# Patient Record
Sex: FEMALE | Race: WHITE | NOT HISPANIC OR LATINO | URBAN - METROPOLITAN AREA
[De-identification: names, ages, dates, MRNs, and addresses within clinical notes are randomized per-mention and may not be internally consistent; named-entity substitution may affect disease eponyms.]

---

## 2019-10-22 ENCOUNTER — DOCTOR'S OFFICE (OUTPATIENT)
Dept: URBAN - METROPOLITAN AREA CLINIC 155 | Facility: CLINIC | Age: 69
Setting detail: OPHTHALMOLOGY
End: 2019-10-22
Payer: COMMERCIAL

## 2019-10-22 DIAGNOSIS — H35.363: ICD-10-CM

## 2019-10-22 DIAGNOSIS — H11.31: ICD-10-CM

## 2019-10-22 PROCEDURE — 92004 COMPRE OPH EXAM NEW PT 1/>: CPT | Performed by: OPHTHALMOLOGY

## 2019-10-22 PROCEDURE — 92134 CPTRZ OPH DX IMG PST SGM RTA: CPT | Performed by: OPHTHALMOLOGY

## 2019-10-22 ASSESSMENT — REFRACTION_MANIFEST
OS_VA2: 20/
OD_VA3: 20/
OU_VA: 20/
OS_VA1: 20/
OS_VA2: 20/
OD_VA2: 20/
OD_VA1: 20/
OU_VA: 20/
OD_VA3: 20/
OD_VA1: 20/
OD_VA2: 20/
OS_VA1: 20/
OS_VA3: 20/
OS_VA3: 20/

## 2019-10-22 ASSESSMENT — CONFRONTATIONAL VISUAL FIELD TEST (CVF)
OD_FINDINGS: FULL
OS_FINDINGS: FULL

## 2019-10-22 ASSESSMENT — VISUAL ACUITY
OD_BCVA: 20/40-1
OS_BCVA: 20/40

## 2019-10-22 ASSESSMENT — REFRACTION_CURRENTRX
OS_OVR_VA: 20/
OD_OVR_VA: 20/
OS_OVR_VA: 20/
OS_OVR_VA: 20/
OD_OVR_VA: 20/
OD_OVR_VA: 20/

## 2020-07-07 ENCOUNTER — OFFICE VISIT (OUTPATIENT)
Dept: CARDIOLOGY | Facility: CLINIC | Age: 70
End: 2020-07-07

## 2020-07-07 VITALS
HEIGHT: 66 IN | TEMPERATURE: 98.1 F | DIASTOLIC BLOOD PRESSURE: 82 MMHG | SYSTOLIC BLOOD PRESSURE: 150 MMHG | WEIGHT: 221 LBS | BODY MASS INDEX: 35.52 KG/M2 | HEART RATE: 67 BPM

## 2020-07-07 DIAGNOSIS — E88.81 METABOLIC SYNDROME: ICD-10-CM

## 2020-07-07 DIAGNOSIS — E78.00 HYPERCHOLESTEREMIA: ICD-10-CM

## 2020-07-07 DIAGNOSIS — I10 ESSENTIAL HYPERTENSION: ICD-10-CM

## 2020-07-07 DIAGNOSIS — R06.02 SHORTNESS OF BREATH: ICD-10-CM

## 2020-07-07 DIAGNOSIS — R07.89 CHEST PRESSURE: Primary | ICD-10-CM

## 2020-07-07 DIAGNOSIS — F17.200 SMOKING: ICD-10-CM

## 2020-07-07 PROBLEM — E88.810 METABOLIC SYNDROME: Status: ACTIVE | Noted: 2020-07-07

## 2020-07-07 PROCEDURE — 99406 BEHAV CHNG SMOKING 3-10 MIN: CPT | Performed by: INTERNAL MEDICINE

## 2020-07-07 PROCEDURE — 99204 OFFICE O/P NEW MOD 45 MIN: CPT | Performed by: INTERNAL MEDICINE

## 2020-07-07 PROCEDURE — 93000 ELECTROCARDIOGRAM COMPLETE: CPT | Performed by: INTERNAL MEDICINE

## 2020-07-07 RX ORDER — LISINOPRIL 10 MG/1
10 TABLET ORAL DAILY
Qty: 30 TABLET | Refills: 11 | Status: SHIPPED | OUTPATIENT
Start: 2020-07-07 | End: 2020-07-16 | Stop reason: DRUGHIGH

## 2020-07-07 RX ORDER — PANTOPRAZOLE SODIUM 40 MG/1
40 TABLET, DELAYED RELEASE ORAL DAILY
Qty: 30 TABLET | Refills: 6 | Status: SHIPPED | OUTPATIENT
Start: 2020-07-07 | End: 2022-06-06

## 2020-07-07 RX ORDER — METOPROLOL SUCCINATE 50 MG/1
50 TABLET, EXTENDED RELEASE ORAL 2 TIMES DAILY
COMMUNITY

## 2020-07-07 RX ORDER — AMLODIPINE BESYLATE 5 MG/1
5 TABLET ORAL DAILY
COMMUNITY
End: 2020-07-07

## 2020-07-14 ENCOUNTER — HOSPITAL ENCOUNTER (OUTPATIENT)
Dept: CARDIOLOGY | Facility: HOSPITAL | Age: 70
Discharge: HOME | End: 2020-07-14

## 2020-07-14 VITALS — HEIGHT: 66 IN | BODY MASS INDEX: 35.43 KG/M2 | WEIGHT: 220.46 LBS

## 2020-07-14 DIAGNOSIS — R06.02 SHORTNESS OF BREATH: ICD-10-CM

## 2020-07-14 DIAGNOSIS — E78.00 HYPERCHOLESTEREMIA: ICD-10-CM

## 2020-07-14 DIAGNOSIS — R07.89 CHEST PRESSURE: ICD-10-CM

## 2020-07-14 LAB
AORTIC DIMENSIONLESS INDEX: 0.9 (DI)
BH CV ECHO MEAS - ACS: 1.2 CM
BH CV ECHO MEAS - AO MAX PG (FULL): 1.3 MMHG
BH CV ECHO MEAS - AO MAX PG: 6.7 MMHG
BH CV ECHO MEAS - AO MEAN PG (FULL): 1 MMHG
BH CV ECHO MEAS - AO MEAN PG: 3 MMHG
BH CV ECHO MEAS - AO ROOT AREA (BSA CORRECTED): 1.4
BH CV ECHO MEAS - AO ROOT AREA: 7.1 CM^2
BH CV ECHO MEAS - AO ROOT DIAM: 3 CM
BH CV ECHO MEAS - AO V2 MAX: 129 CM/SEC
BH CV ECHO MEAS - AO V2 MEAN: 79.9 CM/SEC
BH CV ECHO MEAS - AO V2 VTI: 30.7 CM
BH CV ECHO MEAS - BSA(HAYCOCK): 2.2 M^2
BH CV ECHO MEAS - BSA: 2.1 M^2
BH CV ECHO MEAS - BZI_BMI: 35.7 KILOGRAMS/M^2
BH CV ECHO MEAS - BZI_METRIC_HEIGHT: 167.6 CM
BH CV ECHO MEAS - BZI_METRIC_WEIGHT: 100.2 KG
BH CV ECHO MEAS - EDV(CUBED): 241.8 ML
BH CV ECHO MEAS - EDV(TEICH): 196.1 ML
BH CV ECHO MEAS - EF(CUBED): 78 %
BH CV ECHO MEAS - EF(TEICH): 69.2 %
BH CV ECHO MEAS - ESV(CUBED): 53.2 ML
BH CV ECHO MEAS - ESV(TEICH): 60.4 ML
BH CV ECHO MEAS - FS: 39.6 %
BH CV ECHO MEAS - IVS/LVPW: 1.2
BH CV ECHO MEAS - IVSD: 1.3 CM
BH CV ECHO MEAS - LA DIMENSION: 4.8 CM
BH CV ECHO MEAS - LA/AO: 1.6
BH CV ECHO MEAS - LAT PEAK E' VEL: 7.4 CM/SEC
BH CV ECHO MEAS - LV IVRT: 0.13 SEC
BH CV ECHO MEAS - LV MASS(C)D: 341.6 GRAMS
BH CV ECHO MEAS - LV MASS(C)DI: 163.7 GRAMS/M^2
BH CV ECHO MEAS - LV MAX PG: 5.4 MMHG
BH CV ECHO MEAS - LV MEAN PG: 2 MMHG
BH CV ECHO MEAS - LV V1 MAX: 116 CM/SEC
BH CV ECHO MEAS - LV V1 MEAN: 68.5 CM/SEC
BH CV ECHO MEAS - LV V1 VTI: 28.2 CM
BH CV ECHO MEAS - LVIDD: 6.2 CM
BH CV ECHO MEAS - LVIDS: 3.8 CM
BH CV ECHO MEAS - LVPWD: 1.1 CM
BH CV ECHO MEAS - MED PEAK E' VEL: 6.5 CM/SEC
BH CV ECHO MEAS - MR MAX PG: 71.6 MMHG
BH CV ECHO MEAS - MR MAX VEL: 423 CM/SEC
BH CV ECHO MEAS - MV A MAX VEL: 84.8 CM/SEC
BH CV ECHO MEAS - MV DEC SLOPE: 307 CM/SEC^2
BH CV ECHO MEAS - MV DEC TIME: 0.28 SEC
BH CV ECHO MEAS - MV E MAX VEL: 92.7 CM/SEC
BH CV ECHO MEAS - MV E/A: 1.1
BH CV ECHO MEAS - MV MAX PG: 4.4 MMHG
BH CV ECHO MEAS - MV MEAN PG: 2 MMHG
BH CV ECHO MEAS - MV P1/2T MAX VEL: 104 CM/SEC
BH CV ECHO MEAS - MV P1/2T: 99.2 MSEC
BH CV ECHO MEAS - MV V2 MAX: 105 CM/SEC
BH CV ECHO MEAS - MV V2 MEAN: 61.2 CM/SEC
BH CV ECHO MEAS - MV V2 VTI: 41 CM
BH CV ECHO MEAS - MVA P1/2T LCG: 2.1 CM^2
BH CV ECHO MEAS - MVA(P1/2T): 2.2 CM^2
BH CV ECHO MEAS - PA MAX PG (FULL): 3.5 MMHG
BH CV ECHO MEAS - PA MAX PG: 6.4 MMHG
BH CV ECHO MEAS - PA MEAN PG (FULL): 2 MMHG
BH CV ECHO MEAS - PA MEAN PG: 4 MMHG
BH CV ECHO MEAS - PA V2 MAX: 126 CM/SEC
BH CV ECHO MEAS - PA V2 MEAN: 89.5 CM/SEC
BH CV ECHO MEAS - PA V2 VTI: 36.3 CM
BH CV ECHO MEAS - RAP SYSTOLE: 10 MMHG
BH CV ECHO MEAS - RV MAX PG: 2.8 MMHG
BH CV ECHO MEAS - RV MEAN PG: 2 MMHG
BH CV ECHO MEAS - RV V1 MAX: 84.2 CM/SEC
BH CV ECHO MEAS - RV V1 MEAN: 57.4 CM/SEC
BH CV ECHO MEAS - RV V1 VTI: 25.5 CM
BH CV ECHO MEAS - RVDD: 3.4 CM
BH CV ECHO MEAS - RVSP: 31 MMHG
BH CV ECHO MEAS - SI(AO): 104 ML/M^2
BH CV ECHO MEAS - SI(CUBED): 90.4 ML/M^2
BH CV ECHO MEAS - SI(TEICH): 65 ML/M^2
BH CV ECHO MEAS - SV(AO): 217 ML
BH CV ECHO MEAS - SV(CUBED): 188.6 ML
BH CV ECHO MEAS - SV(TEICH): 135.7 ML
BH CV ECHO MEAS - TR MAX VEL: 229 CM/SEC
BH CV ECHO MEASUREMENTS AVERAGE E/E' RATIO: 13.34
BH CV NUCLEAR PRIOR STUDY: 3
BH CV STRESS DURATION MIN STAGE 1: 3
BH CV STRESS DURATION SEC STAGE 1: 0
BH CV STRESS GRADE STAGE 1: 10
BH CV STRESS METS STAGE 1: 5
BH CV STRESS PROTOCOL 1: NORMAL
BH CV STRESS RECOVERY BP: NORMAL MMHG
BH CV STRESS RECOVERY HR: 87 BPM
BH CV STRESS SPEED STAGE 1: 1.7
BH CV STRESS STAGE 1: 1
LV EF NUC BP: 60 %
MAXIMAL PREDICTED HEART RATE: 152 BPM
PERCENT MAX PREDICTED HR: 83.55 %
STRESS BASELINE BP: NORMAL MMHG
STRESS BASELINE HR: 62 BPM
STRESS PERCENT HR: 98 %
STRESS POST ESTIMATED WORKLOAD: 4.6 METS
STRESS POST EXERCISE DUR MIN: 3 MIN
STRESS POST EXERCISE DUR SEC: 20 SEC
STRESS POST PEAK BP: NORMAL MMHG
STRESS POST PEAK HR: 127 BPM
STRESS TARGET HR: 129 BPM

## 2020-07-14 PROCEDURE — 0 TECHNETIUM SESTAMIBI: Performed by: INTERNAL MEDICINE

## 2020-07-14 PROCEDURE — A9500 TC99M SESTAMIBI: HCPCS | Performed by: INTERNAL MEDICINE

## 2020-07-14 PROCEDURE — 93018 CV STRESS TEST I&R ONLY: CPT | Performed by: INTERNAL MEDICINE

## 2020-07-14 PROCEDURE — 93356 MYOCRD STRAIN IMG SPCKL TRCK: CPT | Performed by: INTERNAL MEDICINE

## 2020-07-14 PROCEDURE — 93016 CV STRESS TEST SUPVJ ONLY: CPT | Performed by: NURSE PRACTITIONER

## 2020-07-14 PROCEDURE — 78452 HT MUSCLE IMAGE SPECT MULT: CPT

## 2020-07-14 PROCEDURE — 78452 HT MUSCLE IMAGE SPECT MULT: CPT | Performed by: INTERNAL MEDICINE

## 2020-07-14 PROCEDURE — 93306 TTE W/DOPPLER COMPLETE: CPT | Performed by: INTERNAL MEDICINE

## 2020-07-14 PROCEDURE — 93306 TTE W/DOPPLER COMPLETE: CPT

## 2020-07-14 PROCEDURE — 93017 CV STRESS TEST TRACING ONLY: CPT

## 2020-07-14 RX ADMIN — TECHNETIUM TC 99M SESTAMIBI 1 DOSE: 1 INJECTION INTRAVENOUS at 13:30

## 2020-07-14 RX ADMIN — TECHNETIUM TC 99M SESTAMIBI 1 DOSE: 1 INJECTION INTRAVENOUS at 12:11

## 2020-07-16 ENCOUNTER — TELEPHONE (OUTPATIENT)
Dept: CARDIOLOGY | Facility: CLINIC | Age: 70
End: 2020-07-16

## 2020-07-16 RX ORDER — LISINOPRIL 20 MG/1
20 TABLET ORAL DAILY
Qty: 90 TABLET | Refills: 3 | Status: SHIPPED | OUTPATIENT
Start: 2020-07-16 | End: 2022-06-06 | Stop reason: DRUGHIGH

## 2020-10-26 RX ORDER — NICOTINE 21 MG/24HR
1 PATCH, TRANSDERMAL 24 HOURS TRANSDERMAL EVERY 24 HOURS
Qty: 30 PATCH | Refills: 5 | Status: SHIPPED | OUTPATIENT
Start: 2020-10-26 | End: 2022-06-06

## 2022-05-20 RX ORDER — NITROGLYCERIN 0.4 MG/1
TABLET SUBLINGUAL
Qty: 100 TABLET | Refills: 11 | Status: SHIPPED | OUTPATIENT
Start: 2022-05-20

## 2022-06-06 ENCOUNTER — OFFICE VISIT (OUTPATIENT)
Dept: CARDIOLOGY | Facility: CLINIC | Age: 72
End: 2022-06-06

## 2022-06-06 VITALS
HEART RATE: 62 BPM | SYSTOLIC BLOOD PRESSURE: 174 MMHG | BODY MASS INDEX: 36.9 KG/M2 | HEIGHT: 66 IN | WEIGHT: 229.6 LBS | DIASTOLIC BLOOD PRESSURE: 90 MMHG

## 2022-06-06 DIAGNOSIS — R00.2 PALPITATIONS: ICD-10-CM

## 2022-06-06 DIAGNOSIS — R09.89 PULSE IRREGULARITY: ICD-10-CM

## 2022-06-06 DIAGNOSIS — E55.9 VITAMIN D DEFICIENCY: ICD-10-CM

## 2022-06-06 DIAGNOSIS — R06.02 SHORTNESS OF BREATH: ICD-10-CM

## 2022-06-06 DIAGNOSIS — D50.8 OTHER IRON DEFICIENCY ANEMIA: ICD-10-CM

## 2022-06-06 DIAGNOSIS — F17.200 SMOKING: ICD-10-CM

## 2022-06-06 DIAGNOSIS — E66.01 SEVERE OBESITY (BMI 35.0-39.9) WITH COMORBIDITY: ICD-10-CM

## 2022-06-06 DIAGNOSIS — I10 ESSENTIAL HYPERTENSION: Primary | ICD-10-CM

## 2022-06-06 PROCEDURE — 99214 OFFICE O/P EST MOD 30 MIN: CPT | Performed by: NURSE PRACTITIONER

## 2022-06-06 PROCEDURE — 93000 ELECTROCARDIOGRAM COMPLETE: CPT | Performed by: NURSE PRACTITIONER

## 2022-06-06 RX ORDER — MULTIVIT-MIN/IRON/FOLIC ACID/K 18-600-40
500 CAPSULE ORAL 3 TIMES DAILY
COMMUNITY

## 2022-06-06 RX ORDER — FERROUS SULFATE TAB EC 324 MG (65 MG FE EQUIVALENT) 324 (65 FE) MG
324 TABLET DELAYED RESPONSE ORAL
COMMUNITY

## 2022-06-06 RX ORDER — LISINOPRIL AND HYDROCHLOROTHIAZIDE 25; 20 MG/1; MG/1
1 TABLET ORAL DAILY
Qty: 90 TABLET | Refills: 2 | Status: SHIPPED | OUTPATIENT
Start: 2022-06-06

## 2023-08-29 ENCOUNTER — DOCTOR'S OFFICE (OUTPATIENT)
Dept: URBAN - METROPOLITAN AREA CLINIC 159 | Facility: CLINIC | Age: 73
Setting detail: OPHTHALMOLOGY
End: 2023-08-29
Payer: COMMERCIAL

## 2023-08-29 DIAGNOSIS — H25.042: ICD-10-CM

## 2023-08-29 DIAGNOSIS — H25.13: ICD-10-CM

## 2023-08-29 DIAGNOSIS — H35.3131: ICD-10-CM

## 2023-08-29 DIAGNOSIS — H25.013: ICD-10-CM

## 2023-08-29 PROCEDURE — 92004 COMPRE OPH EXAM NEW PT 1/>: CPT | Performed by: OPHTHALMOLOGY

## 2023-08-29 PROCEDURE — 92134 CPTRZ OPH DX IMG PST SGM RTA: CPT | Performed by: OPHTHALMOLOGY

## 2023-08-29 ASSESSMENT — REFRACTION_AUTOREFRACTION
OD_SPHERE: +1.25
OS_AXIS: 179
OS_CYLINDER: +1.75
OD_AXIS: 013
OS_SPHERE: +0.25
OD_CYLINDER: +1.00

## 2023-08-29 ASSESSMENT — KERATOMETRY
OD_K1POWER_DIOPTERS: 45.00
OD_AXISANGLE_DEGREES: 180
OS_K1POWER_DIOPTERS: 43.50
OD_K2POWER_DIOPTERS: 45.00
OS_K2POWER_DIOPTERS: 45.00
OS_AXISANGLE_DEGREES: 177

## 2023-08-29 ASSESSMENT — REFRACTION_MANIFEST
OD_CYLINDER: +1.00
OD_VA1: 20/25-1
OD_SPHERE: +0.75
OS_VA2: 20/OU
OS_AXIS: 180
OS_ADD: +3.00
OD_ADD: +3.00
OS_VA1: 20/50+2
OS_SPHERE: PLANO
OD_VA2: 20/20(J1+)
OS_CYLINDER: +1.25
OD_AXIS: 015

## 2023-08-29 ASSESSMENT — REFRACTION_CURRENTRX
OS_CYLINDER: +0.75
OS_VPRISM_DIRECTION: SV
OD_SPHERE: +3.25
OS_SPHERE: +1.25
OD_OVR_VA: 20/
OS_VPRISM_DIRECTION: PROGS
OD_CYLINDER: +0.50
OS_ADD: +2.50
OD_SPHERE: +0.75
OS_AXIS: 180
OS_OVR_VA: 20/
OD_VPRISM_DIRECTION: SV
OD_AXIS: 178
OD_ADD: +2.50
OS_OVR_VA: 20/
OS_SPHERE: +3.25
OD_OVR_VA: 20/
OD_VPRISM_DIRECTION: PROGS

## 2023-08-29 ASSESSMENT — SPHEQUIV_DERIVED
OS_SPHEQUIV: 1.125
OD_SPHEQUIV: 1.25
OD_SPHEQUIV: 1.75

## 2023-08-29 ASSESSMENT — AXIALLENGTH_DERIVED
OS_AL: 22.8991
OD_AL: 22.4204
OD_AL: 22.5977

## 2023-08-29 ASSESSMENT — CONFRONTATIONAL VISUAL FIELD TEST (CVF)
OS_FINDINGS: FULL
OD_FINDINGS: FULL

## 2023-08-29 ASSESSMENT — TONOMETRY
OD_IOP_MMHG: 14
OS_IOP_MMHG: 15

## 2023-08-29 ASSESSMENT — VISUAL ACUITY
OS_BCVA: 20/30-2
OD_BCVA: 20/50

## 2024-01-29 ENCOUNTER — DOCTOR'S OFFICE (OUTPATIENT)
Dept: URBAN - METROPOLITAN AREA CLINIC 166 | Facility: CLINIC | Age: 74
Setting detail: OPHTHALMOLOGY
End: 2024-01-29
Payer: COMMERCIAL

## 2024-01-29 DIAGNOSIS — H35.3131: ICD-10-CM

## 2024-01-29 DIAGNOSIS — H25.042: ICD-10-CM

## 2024-01-29 DIAGNOSIS — H25.013: ICD-10-CM

## 2024-01-29 DIAGNOSIS — H25.13: ICD-10-CM

## 2024-01-29 PROCEDURE — 92014 COMPRE OPH EXAM EST PT 1/>: CPT | Performed by: OPHTHALMOLOGY

## 2024-01-29 PROCEDURE — 92134 CPTRZ OPH DX IMG PST SGM RTA: CPT | Performed by: OPHTHALMOLOGY

## 2024-01-29 ASSESSMENT — REFRACTION_MANIFEST
OS_SPHERE: PLANO
OS_VA1: 20/70+1
OU_VA: 20/40+2
OD_SPHERE: +0.75
OD_AXIS: 015
OS_ADD: +3.00
OD_SPHERE: +0.75
OS_VA2: 20/BINOC
OD_VA2: 20/20(J1+)
OS_VA1: 20/50+2
OD_CYLINDER: +0.50
OS_AXIS: 010
OD_AXIS: 015
OD_ADD: +2.75
OS_VA2: 20/OU
OS_AXIS: 180
OS_SPHERE: -0.25
OD_VA2: 20/20(J1+)
OD_CYLINDER: +1.00
OD_VA1: 20/40+2
OD_VA1: 20/25-1
OS_ADD: +2.75
OD_ADD: +3.00
OS_CYLINDER: +1.75
OS_CYLINDER: +1.25

## 2024-01-29 ASSESSMENT — REFRACTION_AUTOREFRACTION
OS_CYLINDER: +2.00
OD_AXIS: 011
OD_CYLINDER: +1.00
OS_SPHERE: -0.25
OD_SPHERE: +0.50
OS_AXIS: 012

## 2024-01-29 ASSESSMENT — SPHEQUIV_DERIVED
OD_SPHEQUIV: 1
OS_SPHEQUIV: 0.75
OD_SPHEQUIV: 1
OS_SPHEQUIV: 0.625
OD_SPHEQUIV: 1.25

## 2024-01-29 ASSESSMENT — REFRACTION_CURRENTRX
OS_CYLINDER: +0.75
OS_ADD: +2.50
OD_VPRISM_DIRECTION: PROGS
OS_OVR_VA: 20/
OD_OVR_VA: 20/
OS_SPHERE: +3.25
OS_OVR_VA: 20/
OS_VPRISM_DIRECTION: SV
OS_SPHERE: +1.25
OD_ADD: +2.50
OD_SPHERE: +3.25
OD_VPRISM_DIRECTION: SV
OD_SPHERE: +0.75
OD_OVR_VA: 20/
OS_AXIS: 180
OD_AXIS: 178
OD_CYLINDER: +0.50
OS_VPRISM_DIRECTION: PROGS

## 2024-01-29 ASSESSMENT — CONFRONTATIONAL VISUAL FIELD TEST (CVF)
OS_FINDINGS: FULL
OD_FINDINGS: FULL

## 2024-02-08 ENCOUNTER — DOCTOR'S OFFICE (OUTPATIENT)
Dept: URBAN - METROPOLITAN AREA CLINIC 166 | Facility: CLINIC | Age: 74
Setting detail: OPHTHALMOLOGY
End: 2024-02-08
Payer: COMMERCIAL

## 2024-02-08 DIAGNOSIS — H25.12: ICD-10-CM

## 2024-02-08 PROCEDURE — 92136 OPHTHALMIC BIOMETRY: CPT | Mod: LT | Performed by: OPHTHALMOLOGY

## 2024-02-08 ASSESSMENT — REFRACTION_MANIFEST
OS_VA2: 20/BINOC
OS_ADD: +2.75
OU_VA: 20/40+2
OD_AXIS: 015
OS_AXIS: 180
OS_CYLINDER: +1.75
OD_VA1: 20/40+2
OD_SPHERE: +0.75
OS_VA2: 20/OU
OS_SPHERE: PLANO
OS_CYLINDER: +1.25
OD_ADD: +3.00
OD_ADD: +2.75
OD_VA2: 20/20(J1+)
OD_VA2: 20/20(J1+)
OS_ADD: +3.00
OS_SPHERE: -0.25
OS_VA1: 20/70+1
OD_AXIS: 015
OS_VA1: 20/50+2
OS_AXIS: 010
OD_SPHERE: +0.75
OD_CYLINDER: +0.50
OD_CYLINDER: +1.00
OD_VA1: 20/25-1

## 2024-02-08 ASSESSMENT — REFRACTION_CURRENTRX
OS_CYLINDER: +0.75
OD_CYLINDER: +0.50
OS_VPRISM_DIRECTION: PROGS
OD_SPHERE: +0.75
OD_SPHERE: +3.25
OS_SPHERE: +3.25
OS_SPHERE: +1.25
OD_ADD: +2.50
OS_AXIS: 180
OD_VPRISM_DIRECTION: PROGS
OD_VPRISM_DIRECTION: SV
OS_ADD: +2.50
OD_OVR_VA: 20/
OD_OVR_VA: 20/
OS_OVR_VA: 20/
OS_OVR_VA: 20/
OS_VPRISM_DIRECTION: SV
OD_AXIS: 178

## 2024-02-08 ASSESSMENT — REFRACTION_AUTOREFRACTION
OS_CYLINDER: +2.00
OD_CYLINDER: +1.00
OS_AXIS: 012
OD_SPHERE: +0.50
OS_SPHERE: -0.25
OD_AXIS: 011

## 2024-02-08 ASSESSMENT — SPHEQUIV_DERIVED
OD_SPHEQUIV: 1
OS_SPHEQUIV: 0.625
OD_SPHEQUIV: 1
OS_SPHEQUIV: 0.75
OD_SPHEQUIV: 1.25

## 2024-03-14 ENCOUNTER — TELEPHONE (OUTPATIENT)
Dept: CARDIOLOGY | Facility: CLINIC | Age: 74
End: 2024-03-14
Payer: MEDICARE

## 2024-03-27 ENCOUNTER — AMBUL SURGICAL CARE (OUTPATIENT)
Dept: URBAN - METROPOLITAN AREA CLINIC 158 | Facility: CLINIC | Age: 74
Setting detail: OPHTHALMOLOGY
End: 2024-03-27
Payer: COMMERCIAL

## 2024-03-27 DIAGNOSIS — H25.12: ICD-10-CM

## 2024-03-27 PROCEDURE — 66984 XCAPSL CTRC RMVL W/O ECP: CPT | Mod: LT | Performed by: OPHTHALMOLOGY

## 2024-03-28 ENCOUNTER — DOCTOR'S OFFICE (OUTPATIENT)
Dept: URBAN - METROPOLITAN AREA CLINIC 166 | Facility: CLINIC | Age: 74
Setting detail: OPHTHALMOLOGY
End: 2024-03-28
Payer: COMMERCIAL

## 2024-03-28 DIAGNOSIS — Z96.1: ICD-10-CM

## 2024-03-28 DIAGNOSIS — H25.11: ICD-10-CM

## 2024-03-28 DIAGNOSIS — H25.041: ICD-10-CM

## 2024-03-28 DIAGNOSIS — H25.011: ICD-10-CM

## 2024-03-28 PROCEDURE — 99024 POSTOP FOLLOW-UP VISIT: CPT | Performed by: OPHTHALMOLOGY

## 2024-03-28 ASSESSMENT — REFRACTION_CURRENTRX
OD_SPHERE: +3.25
OD_SPHERE: +0.75
OS_OVR_VA: 20/
OD_OVR_VA: 20/
OD_CYLINDER: +0.50
OS_CYLINDER: +0.75
OD_ADD: +2.50
OD_VPRISM_DIRECTION: PROGS
OS_SPHERE: +1.25
OD_AXIS: 178
OS_VPRISM_DIRECTION: SV
OS_OVR_VA: 20/
OS_AXIS: 180
OD_VPRISM_DIRECTION: SV
OS_VPRISM_DIRECTION: PROGS
OS_SPHERE: +3.25
OD_OVR_VA: 20/
OS_ADD: +2.50

## 2024-03-28 ASSESSMENT — REFRACTION_MANIFEST
OU_VA: 20/40+2
OD_ADD: +2.75
OS_CYLINDER: +1.75
OS_VA2: 20/BINOC
OD_AXIS: 015
OD_SPHERE: +0.75
OS_SPHERE: -0.25
OS_VA1: 20/50+2
OD_VA1: 20/25-1
OS_ADD: +2.75
OS_SPHERE: PLANO
OD_CYLINDER: +0.50
OD_VA1: 20/40+2
OD_AXIS: 015
OD_VA2: 20/20(J1+)
OD_VA2: 20/20(J1+)
OS_CYLINDER: +1.25
OD_ADD: +3.00
OS_VA2: 20/OU
OD_SPHERE: +0.75
OD_CYLINDER: +1.00
OS_VA1: 20/70+1
OS_ADD: +3.00
OS_AXIS: 180
OS_AXIS: 010

## 2024-03-28 ASSESSMENT — SPHEQUIV_DERIVED
OS_SPHEQUIV: 0.625
OD_SPHEQUIV: 1.25
OD_SPHEQUIV: 1

## 2024-04-08 ENCOUNTER — OFFICE VISIT (OUTPATIENT)
Dept: CARDIOLOGY | Facility: CLINIC | Age: 74
End: 2024-04-08
Payer: MEDICARE

## 2024-04-08 VITALS
SYSTOLIC BLOOD PRESSURE: 152 MMHG | BODY MASS INDEX: 36.19 KG/M2 | HEIGHT: 66 IN | HEART RATE: 66 BPM | WEIGHT: 225.2 LBS | DIASTOLIC BLOOD PRESSURE: 82 MMHG

## 2024-04-08 DIAGNOSIS — R06.02 SHORTNESS OF BREATH: ICD-10-CM

## 2024-04-08 DIAGNOSIS — R00.2 PALPITATIONS: ICD-10-CM

## 2024-04-08 DIAGNOSIS — I10 ESSENTIAL HYPERTENSION: Primary | ICD-10-CM

## 2024-04-08 DIAGNOSIS — E78.00 HYPERCHOLESTEREMIA: ICD-10-CM

## 2024-04-08 PROCEDURE — 3079F DIAST BP 80-89 MM HG: CPT | Performed by: NURSE PRACTITIONER

## 2024-04-08 PROCEDURE — 93000 ELECTROCARDIOGRAM COMPLETE: CPT | Performed by: NURSE PRACTITIONER

## 2024-04-08 PROCEDURE — 99214 OFFICE O/P EST MOD 30 MIN: CPT | Performed by: NURSE PRACTITIONER

## 2024-04-08 PROCEDURE — 3077F SYST BP >= 140 MM HG: CPT | Performed by: NURSE PRACTITIONER

## 2024-04-08 RX ORDER — CYANOCOBALAMIN (VITAMIN B-12) 1000 MCG
1 TABLET ORAL DAILY
COMMUNITY

## 2024-04-08 RX ORDER — LISINOPRIL 20 MG/1
20 TABLET ORAL DAILY
COMMUNITY
End: 2024-04-08 | Stop reason: SDUPTHER

## 2024-04-08 RX ORDER — LISINOPRIL 30 MG/1
30 TABLET ORAL DAILY
Qty: 90 TABLET | Refills: 3 | Status: SHIPPED | OUTPATIENT
Start: 2024-04-08

## 2024-04-10 ENCOUNTER — AMBUL SURGICAL CARE (OUTPATIENT)
Dept: URBAN - METROPOLITAN AREA CLINIC 158 | Facility: CLINIC | Age: 74
Setting detail: OPHTHALMOLOGY
End: 2024-04-10
Payer: COMMERCIAL

## 2024-04-10 DIAGNOSIS — H25.11: ICD-10-CM

## 2024-04-10 PROCEDURE — 66984 XCAPSL CTRC RMVL W/O ECP: CPT | Mod: 79,RT | Performed by: OPHTHALMOLOGY

## 2024-04-11 ENCOUNTER — DOCTOR'S OFFICE (OUTPATIENT)
Dept: URBAN - METROPOLITAN AREA CLINIC 166 | Facility: CLINIC | Age: 74
Setting detail: OPHTHALMOLOGY
End: 2024-04-11
Payer: COMMERCIAL

## 2024-04-11 DIAGNOSIS — Z96.1: ICD-10-CM

## 2024-04-11 PROCEDURE — 99024 POSTOP FOLLOW-UP VISIT: CPT | Performed by: OPHTHALMOLOGY

## 2024-04-18 ENCOUNTER — HOSPITAL ENCOUNTER (OUTPATIENT)
Dept: CARDIOLOGY | Facility: HOSPITAL | Age: 74
Discharge: HOME | End: 2024-04-18
Payer: MEDICARE

## 2024-04-18 VITALS — WEIGHT: 224.87 LBS | BODY MASS INDEX: 36.14 KG/M2 | HEIGHT: 66 IN

## 2024-04-18 DIAGNOSIS — R00.2 PALPITATIONS: ICD-10-CM

## 2024-04-18 DIAGNOSIS — R06.02 SHORTNESS OF BREATH: ICD-10-CM

## 2024-04-18 PROCEDURE — 0 TECHNETIUM SESTAMIBI: Performed by: INTERNAL MEDICINE

## 2024-04-18 PROCEDURE — 25010000002 REGADENOSON 0.4 MG/5ML SOLUTION: Performed by: INTERNAL MEDICINE

## 2024-04-18 PROCEDURE — 78452 HT MUSCLE IMAGE SPECT MULT: CPT

## 2024-04-18 PROCEDURE — A9500 TC99M SESTAMIBI: HCPCS | Performed by: INTERNAL MEDICINE

## 2024-04-18 PROCEDURE — 93017 CV STRESS TEST TRACING ONLY: CPT

## 2024-04-18 PROCEDURE — 93306 TTE W/DOPPLER COMPLETE: CPT

## 2024-04-18 RX ORDER — REGADENOSON 0.08 MG/ML
0.4 INJECTION, SOLUTION INTRAVENOUS
Status: COMPLETED | OUTPATIENT
Start: 2024-04-18 | End: 2024-04-18

## 2024-04-18 RX ADMIN — TECHNETIUM TC 99M SESTAMIBI 1 DOSE: 1 INJECTION INTRAVENOUS at 12:11

## 2024-04-18 RX ADMIN — TECHNETIUM TC 99M SESTAMIBI 1 DOSE: 1 INJECTION INTRAVENOUS at 13:41

## 2024-04-18 RX ADMIN — REGADENOSON 0.4 MG: 0.08 INJECTION, SOLUTION INTRAVENOUS at 13:41

## 2024-04-22 LAB
AORTIC DIMENSIONLESS INDEX: 0.84 (DI)
BH CV ECHO MEAS - ACS: 1.09 CM
BH CV ECHO MEAS - AO MAX PG: 6 MMHG
BH CV ECHO MEAS - AO MEAN PG: 3.2 MMHG
BH CV ECHO MEAS - AO ROOT DIAM: 3 CM
BH CV ECHO MEAS - AO V2 MAX: 122.7 CM/SEC
BH CV ECHO MEAS - AO V2 VTI: 32.3 CM
BH CV ECHO MEAS - EDV(CUBED): 158.2 ML
BH CV ECHO MEAS - EF(MOD-BP): 54 %
BH CV ECHO MEAS - ESV(CUBED): 50.4 ML
BH CV ECHO MEAS - FS: 31.7 %
BH CV ECHO MEAS - IVS/LVPW: 0.95 CM
BH CV ECHO MEAS - IVSD: 1.31 CM
BH CV ECHO MEAS - LA DIMENSION: 4.5 CM
BH CV ECHO MEAS - LAT PEAK E' VEL: 6.5 CM/SEC
BH CV ECHO MEAS - LV MASS(C)D: 310.4 GRAMS
BH CV ECHO MEAS - LV MAX PG: 4 MMHG
BH CV ECHO MEAS - LV MEAN PG: 2.01 MMHG
BH CV ECHO MEAS - LV V1 MAX: 100 CM/SEC
BH CV ECHO MEAS - LV V1 VTI: 27.1 CM
BH CV ECHO MEAS - LVIDD: 5.4 CM
BH CV ECHO MEAS - LVIDS: 3.7 CM
BH CV ECHO MEAS - LVPWD: 1.38 CM
BH CV ECHO MEAS - MED PEAK E' VEL: 6 CM/SEC
BH CV ECHO MEAS - MV A MAX VEL: 90.9 CM/SEC
BH CV ECHO MEAS - MV DEC SLOPE: 388.3 CM/SEC2
BH CV ECHO MEAS - MV DEC TIME: 0.32 SEC
BH CV ECHO MEAS - MV E MAX VEL: 79.9 CM/SEC
BH CV ECHO MEAS - MV E/A: 0.88
BH CV ECHO MEAS - MV MAX PG: 4.2 MMHG
BH CV ECHO MEAS - MV MEAN PG: 1.56 MMHG
BH CV ECHO MEAS - MV P1/2T: 75.8 MSEC
BH CV ECHO MEAS - MV V2 VTI: 44.1 CM
BH CV ECHO MEAS - MVA(P1/2T): 2.9 CM2
BH CV ECHO MEAS - PA V2 MAX: 128.5 CM/SEC
BH CV ECHO MEAS - RAP SYSTOLE: 8 MMHG
BH CV ECHO MEAS - RV MAX PG: 1.87 MMHG
BH CV ECHO MEAS - RV V1 MAX: 68.3 CM/SEC
BH CV ECHO MEAS - RV V1 VTI: 19.3 CM
BH CV ECHO MEAS - RVDD: 3.5 CM
BH CV ECHO MEAS - RVSP: 16.6 MMHG
BH CV ECHO MEAS - TAPSE (>1.6): 2.5 CM
BH CV ECHO MEAS - TR MAX PG: 8.6 MMHG
BH CV ECHO MEAS - TR MAX VEL: 146.8 CM/SEC
BH CV ECHO MEASUREMENTS AVERAGE E/E' RATIO: 12.78
BH CV REST NUCLEAR ISOTOPE DOSE: 10 MCI
BH CV STRESS COMMENTS STAGE 1: NORMAL
BH CV STRESS DOSE REGADENOSON STAGE 1: 0.4
BH CV STRESS DURATION MIN STAGE 1: 0
BH CV STRESS DURATION SEC STAGE 1: 10
BH CV STRESS NUCLEAR ISOTOPE DOSE: 30 MCI
BH CV STRESS PROTOCOL 1: NORMAL
BH CV STRESS RECOVERY BP: NORMAL MMHG
BH CV STRESS RECOVERY HR: 73 BPM
BH CV STRESS STAGE 1: 1
BH CV XLRA - TDI S': 11.3 CM/SEC
LV EF NUC BP: 60 %
MAXIMAL PREDICTED HEART RATE: 148 BPM
PERCENT MAX PREDICTED HR: 60.81 %
SINUS: 3.2 CM
STRESS BASELINE BP: NORMAL MMHG
STRESS BASELINE HR: 70 BPM
STRESS PERCENT HR: 72 %
STRESS POST PEAK BP: NORMAL MMHG
STRESS POST PEAK HR: 90 BPM
STRESS TARGET HR: 126 BPM

## 2024-05-09 ENCOUNTER — DOCTOR'S OFFICE (OUTPATIENT)
Dept: URBAN - METROPOLITAN AREA CLINIC 166 | Facility: CLINIC | Age: 74
Setting detail: OPHTHALMOLOGY
End: 2024-05-09
Payer: COMMERCIAL

## 2024-05-09 DIAGNOSIS — Z96.1: ICD-10-CM

## 2024-05-09 PROCEDURE — 99024 POSTOP FOLLOW-UP VISIT: CPT | Performed by: OPHTHALMOLOGY

## 2024-09-12 ENCOUNTER — DOCTOR'S OFFICE (OUTPATIENT)
Dept: URBAN - METROPOLITAN AREA CLINIC 166 | Facility: CLINIC | Age: 74
Setting detail: OPHTHALMOLOGY
End: 2024-09-12
Payer: COMMERCIAL

## 2024-09-12 ENCOUNTER — RX ONLY (RX ONLY)
Age: 74
End: 2024-09-12

## 2024-09-12 DIAGNOSIS — H35.3131: ICD-10-CM

## 2024-09-12 DIAGNOSIS — H26.493: ICD-10-CM

## 2024-09-12 PROCEDURE — 92134 CPTRZ OPH DX IMG PST SGM RTA: CPT | Performed by: OPHTHALMOLOGY

## 2024-09-12 PROCEDURE — 92014 COMPRE OPH EXAM EST PT 1/>: CPT | Performed by: OPHTHALMOLOGY

## 2024-09-12 ASSESSMENT — REFRACTION_MANIFEST
OS_VA1: 20/25-2
OS_ADD: +2.75
OS_AXIS: 010
OD_SPHERE: +0.75
OS_SPHERE: -1.00
OD_VA1: 20/30
OS_AXIS: 180
OS_CYLINDER: +1.75
OS_VA1: 20/70+1
OS_VA2: 20/OU
OD_ADD: +2.75
OD_CYLINDER: +1.00
OD_SPHERE: -0.75
OD_SPHERE: -0.75
OS_VA1: 20/30+1
OS_VA1: 20/50+2
OS_ADD: +3.00
OS_VA2: 20/BINOC
OD_CYLINDER: +1.25
OD_AXIS: 015
OS_ADD: +2.75
OD_VA2: 20/20(J1+)
OD_VA2: 20/20(J1+)
OD_ADD: +2.75
OD_VA2: 20/25(J1)
OD_AXIS: 180
OS_CYLINDER: +1.25
OS_SPHERE: -0.25
OS_AXIS: 180
OS_CYLINDER: +2.00
OD_VA1: 20/25+2
OU_VA: 20/40+2
OD_SPHERE: +0.75
OS_VA2: BINOC
OD_VA1: 20/40+2
OD_CYLINDER: +1.00
OS_SPHERE: -1.25
OD_ADD: +3.00
OD_VA1: 20/25-1
OD_AXIS: 015
OS_ADD: +2.75
OS_AXIS: 180
OU_VA: 20/25-1
OD_ADD: +2.75
OS_SPHERE: PLANO
OS_CYLINDER: +1.25
OD_AXIS: 180
OD_VA2: 20/20(J1+)
OD_CYLINDER: +0.50

## 2024-09-12 ASSESSMENT — REFRACTION_CURRENTRX
OS_VPRISM_DIRECTION: PROGS
OS_VPRISM_DIRECTION: SV
OD_VPRISM_DIRECTION: SV
OD_OVR_VA: 20/
OS_SPHERE: +3.25
OD_AXIS: 178
OS_AXIS: 180
OD_SPHERE: +3.25
OD_SPHERE: +0.75
OD_OVR_VA: 20/
OS_ADD: +2.50
OD_CYLINDER: +0.50
OS_CYLINDER: +0.75
OS_OVR_VA: 20/
OS_OVR_VA: 20/
OS_SPHERE: +1.25
OD_ADD: +2.50
OD_VPRISM_DIRECTION: PROGS

## 2024-09-12 ASSESSMENT — KERATOMETRY
OS_K1POWER_DIOPTERS: 43.25
OS_AXISANGLE_DEGREES: 176
OD_AXISANGLE_DEGREES: 175
OD_K1POWER_DIOPTERS: 44.50
OS_K2POWER_DIOPTERS: 44.50
OD_K2POWER_DIOPTERS: 45.25

## 2024-09-12 ASSESSMENT — REFRACTION_AUTOREFRACTION
OS_AXIS: 177
OD_SPHERE: -0.75
OS_CYLINDER: +2.00
OD_CYLINDER: +1.00
OD_AXIS: 170
OS_SPHERE: -1.00

## 2024-09-12 ASSESSMENT — VISUAL ACUITY
OD_BCVA: 20/60-2
OS_BCVA: 20/30-2

## 2024-09-12 ASSESSMENT — CONFRONTATIONAL VISUAL FIELD TEST (CVF)
OD_FINDINGS: FULL
OS_FINDINGS: FULL

## 2024-09-12 ASSESSMENT — TONOMETRY
OD_IOP_MMHG: 16
OS_IOP_MMHG: 16

## 2024-10-16 ENCOUNTER — OFFICE VISIT (OUTPATIENT)
Dept: CARDIOLOGY | Facility: CLINIC | Age: 74
End: 2024-10-16
Payer: MEDICARE

## 2024-10-16 VITALS
BODY MASS INDEX: 37.38 KG/M2 | SYSTOLIC BLOOD PRESSURE: 140 MMHG | WEIGHT: 232.6 LBS | HEART RATE: 68 BPM | HEIGHT: 66 IN | DIASTOLIC BLOOD PRESSURE: 80 MMHG

## 2024-10-16 DIAGNOSIS — E78.00 HYPERCHOLESTEREMIA: ICD-10-CM

## 2024-10-16 DIAGNOSIS — R94.39 ABNORMAL STRESS TEST: ICD-10-CM

## 2024-10-16 DIAGNOSIS — R06.02 SHORTNESS OF BREATH: Primary | ICD-10-CM

## 2024-10-16 DIAGNOSIS — I10 ESSENTIAL HYPERTENSION: ICD-10-CM

## 2024-10-16 DIAGNOSIS — I20.89 STABLE ANGINA PECTORIS: ICD-10-CM

## 2024-10-16 PROCEDURE — 99214 OFFICE O/P EST MOD 30 MIN: CPT | Performed by: NURSE PRACTITIONER

## 2024-10-16 PROCEDURE — 3077F SYST BP >= 140 MM HG: CPT | Performed by: NURSE PRACTITIONER

## 2024-10-16 PROCEDURE — 3079F DIAST BP 80-89 MM HG: CPT | Performed by: NURSE PRACTITIONER

## 2024-10-16 PROCEDURE — 1159F MED LIST DOCD IN RCRD: CPT | Performed by: NURSE PRACTITIONER

## 2024-10-16 PROCEDURE — 1160F RVW MEDS BY RX/DR IN RCRD: CPT | Performed by: NURSE PRACTITIONER

## 2024-10-20 ENCOUNTER — TELEPHONE (OUTPATIENT)
Dept: CARDIOLOGY | Facility: CLINIC | Age: 74
End: 2024-10-20
Payer: MEDICARE

## 2024-10-20 PROBLEM — R94.39 ABNORMAL STRESS TEST: Status: ACTIVE | Noted: 2024-10-20

## 2024-10-20 PROBLEM — I20.89 STABLE ANGINA PECTORIS: Status: ACTIVE | Noted: 2024-10-20

## 2024-10-29 ENCOUNTER — OUTSIDE FACILITY SERVICE (OUTPATIENT)
Dept: CARDIOLOGY | Facility: CLINIC | Age: 74
End: 2024-10-29
Payer: MEDICARE

## 2024-11-18 ENCOUNTER — TELEPHONE (OUTPATIENT)
Dept: CARDIOLOGY | Facility: CLINIC | Age: 74
End: 2024-11-18
Payer: MEDICARE

## 2024-11-18 NOTE — TELEPHONE ENCOUNTER
She had recent stenting of the circumflex and RCA.  Would be extremely high risk to stop blood thinner.    Recommend close monitoring of the CBC.  Correct the AVM/bleed.  May need repeat upper endoscopy.    Is her PCP checking her CBC?

## 2024-11-18 NOTE — TELEPHONE ENCOUNTER
Patient called stating that she had cath with stenting on 10/29/24 since then she has been in [facility] for losing blood. She states that she had to get a transfusion over the weekend. She states that hemoglobin got down to 7.2 went back to 7.9. Patient states that she is still taking plavix and aspirin. I have faxed to get records from [facility] sent over. Dr. Chi did scope and did cauterize one place.   Yes...

## 2024-11-19 NOTE — TELEPHONE ENCOUNTER
Patient is going to be seeing Dr. Chi tomorrow in office. She was made aware that she is unsure who will be rechecking blood count, but she was made aware that if Dr. Chi does not order to let us or PCP know so that we can make sure that we are staying on top of it.    Patient was made aware to continue aspirin and Plavix the same due to recent stenting.

## 2024-11-21 ENCOUNTER — TELEPHONE (OUTPATIENT)
Dept: CARDIOLOGY | Facility: CLINIC | Age: 74
End: 2024-11-21
Payer: MEDICARE

## 2024-11-21 NOTE — TELEPHONE ENCOUNTER
Salima out of office today.  Patient called back to update after seeing Dr. Chi yesterday.  Patient states her hemoglobin back down to 7.1.  She states she is to go today or tomorrow for another transfusion.  She is very concerned.  She said Dr. Chi is planning doing another EGD/colonoscopy in January.  She is taking her Plavix 75 mg daily and ASA 81 mg daily.    10/29/24 FFR of LAD- 0.9. LCX- 3.5x18. RCA- 3.0x26 [place] stents   Hgb 11.7 at cath.

## 2024-11-21 NOTE — TELEPHONE ENCOUNTER
Patient is aware of risk of MI, she is not asking to stop Plavix.  She wanted to update getting another transfusion.

## 2024-11-21 NOTE — TELEPHONE ENCOUNTER
I spoke with patient. She is taking her Plavix and ASA.  She did have transfusion today.  Patient states she is to have hemoglobin rechecked next week with Dr. Chi or PCP.  She is aware to call our office if she has any questions.

## 2024-11-27 ENCOUNTER — TELEPHONE (OUTPATIENT)
Dept: CARDIOLOGY | Facility: CLINIC | Age: 74
End: 2024-11-27

## 2024-11-27 NOTE — TELEPHONE ENCOUNTER
Caller: HARSHHIRAM    Relationship: Emergency Contact    Best call back number: [phone]       What is the best time to reach you: ANYTIME    Who are you requesting to speak with (clinical staff, provider,  specific staff member): PROVIDER    Do you know the name of the person who called: NA    What was the call regarding: PT HAS HAD TO HAVE BLOOD TRANSFUSIONS SINCE STENT PLACEMENT.   25 YEARS AGO PT HAD TRANSFUSION DONE TO STOP BLOOD FLOW, NOT SURE WHAT IT WAS DONE FOR BUT CAN CALL PT TO ASK.  6.1 HEMOGLOBIN LEVEL TODAY ON 11.27.24  WORRIED ABOUT DOCTOR IN [place] OPENING UP ARTERIES/VEINS.   PT CONSTANTLY LOSING BLOOD SINCE STENT PLACEMENT ABOUT 1 MONTH AGO.   PT'S HEMOGLOBIN WAS 7.2 LAST WEEK.  PT'S DAUGHTER REPORTS PT'S COLOR IS GRAYISH TODAY.     Is it okay if the provider responds through MyChart: NO

## 2024-11-27 NOTE — TELEPHONE ENCOUNTER
"Spoke with daughter Shirley, patient is currently in [facility]. Daughter has concerns related to patient having to have transfusions since cath. Reports hemoglobin was 6.1, was rechecking at hospital and wanted to let you know if hemoglobin remained 6 that she would have to have another transfusion. States \"she had ablation in 2000 in [place] and just hope it is okay\".     Daughter reports hospital to call Dr Castorena if hemoglobin is 6.  "

## 2025-01-20 NOTE — PROGRESS NOTES
"Chief Complaint   Patient presents with    Hospital follow up     Had cardiac cath on 10/29/24 with stent placement, see chart.    Lab     Last labs a week ago per PCP. She reports labs are stable, she has decided not to have colonoscopy until labs start to decline.    GI bleed      Had bleed after stent placement, see chart.    Med Refill     Needs refills on nifedipine, lisinopril, crestor, and plavix-90 days.     Osvaldo Mendoza is a 74 y.o. female with HTN, palpitations, tobacco abuse, chronic anemia, cardiac arrhythmia with ablation in 2000, and GERD. She was referred in 2020 for uncontrolled HTN, SOA, and chest pain. Cardiac workup showed questionable apical ischemia, HTN response. Lisinopril increased. Repeat stress 4/2024 again showed questionable ischemia in the anterior wall, HTN response (243/120). Procardia XL 30 mg added. Advised to proceed with cardiac cath. On 10/29/24, she underwent stenting of the LCX and RCA. After starting DAPT, she developed GI bleeding. Transfused x 3 since then. 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  On 11/20/24 Dr. Arnold cauterized \"10 spots\" Hgb 7.1, transfusion on 11/27, hgb 6.1.     She returns today for cath follow up. CP and SOB have resolved. Her main concern is the anemia. She was referred to GI in [place], recommended pill cam. She is getting CBC checked frequently with PCP. So far, the hgb has been holding steady, last wee, 8.6. She prefers conservative management as long as she is not bleeding. She has been unable to quit smoking.        Cardiac History:    Past Surgical History:   Procedure Laterality Date    CARDIAC CATHETERIZATION  10/29/2024    FFR of LAD- 0.9. LCX- 3.5x18. RCA- 3.0x26 [place] stents    CARDIOVASCULAR STRESS TEST  07/14/2020    3 Min.20 Secs. 4.0 METS. 84% THR. BP- 203/102. EF 60%. R/O Apical Ischemia.    CARDIOVASCULAR STRESS TEST  04/18/2024    Lexiscan-  EF 60%. " 243/120. R/O Breast attenuation    ECHO - CONVERTED  07/14/2020    TLS. EF 65%. LA- 4.8 Cm. Mild- Mod MR. RVSP- 31 mmHg    ECHO - CONVERTED  04/16/2024    TLS. EF 60%. LA- 4.5.Trace-Mild MR. RVSP- 17 mmHg     Current Outpatient Medications   Medication Sig Dispense Refill    albuterol sulfate  (90 Base) MCG/ACT inhaler Inhale 2 puffs Every 4 (Four) Hours As Needed for Wheezing.      aspirin 81 MG EC tablet Take 1 tablet by mouth Daily.      clopidogrel (PLAVIX) 75 MG tablet Take 1 tablet by mouth Daily. 90 tablet 3    Cyanocobalamin (B-12) 1000 MCG tablet Take 1 tablet by mouth Daily.      ferrous sulfate 324 (65 Fe) MG tablet delayed-release EC tablet Take 1 tablet by mouth 2 (Two) Times a Day With Meals.      lisinopril (PRINIVIL,ZESTRIL) 30 MG tablet Take 1 tablet by mouth Daily. 90 tablet 3    metoprolol succinate XL (TOPROL-XL) 50 MG 24 hr tablet Take 1 tablet by mouth 2 (two) times a day.      NIFEdipine XL (PROCARDIA XL) 30 MG 24 hr tablet Take 1 tablet by mouth Daily. 90 tablet 3    nitroglycerin (NITROSTAT) 0.4 MG SL tablet 1 under the tongue as needed for angina, may repeat q5mins for up three doses 100 tablet 11    rosuvastatin (CRESTOR) 10 MG tablet Take 1 tablet by mouth Daily. 90 tablet 3    vitamin D3 125 MCG (5000 UT) capsule capsule Take 1 capsule by mouth Daily.      pantoprazole (PROTONIX) 40 MG EC tablet Take 1 tablet by mouth Daily. 90 tablet 3     No current facility-administered medications for this visit.     Tagamet [cimetidine]    Past Medical History:   Diagnosis Date    Anemia     Cancer 1982    colon cancer    History of colon surgery     History of GI bleed     Hypertension      Social History     Socioeconomic History    Marital status:    Tobacco Use    Smoking status: Every Day     Current packs/day: 1.00     Average packs/day: 1.5 packs/day for 39.1 years (58.5 ttl pk-yrs)     Types: Cigarettes     Start date: 1986    Smokeless tobacco: Never   Vaping Use    Vaping  "status: Never Used   Substance and Sexual Activity    Alcohol use: Never    Drug use: Never    Sexual activity: Defer     Family History   Problem Relation Age of Onset    Hepatitis Mother     Hypertension Sister     Heart attack Sister     Heart disease Sister     Hyperlipidemia Sister     Cancer Brother     Hypertension Brother      Review of Systems   Constitutional: Positive for malaise/fatigue and weight loss (-9). Negative for decreased appetite.   HENT: Negative.     Eyes:  Negative for blurred vision.   Cardiovascular:  Negative for chest pain, dyspnea on exertion, leg swelling, palpitations and syncope.   Respiratory:  Negative for shortness of breath and sleep disturbances due to breathing.    Endocrine: Negative.    Hematologic/Lymphatic: Negative for bleeding problem. Does not bruise/bleed easily.   Skin: Negative.    Musculoskeletal:  Negative for falls and myalgias.   Gastrointestinal:  Negative for abdominal pain, heartburn and melena.   Genitourinary:  Negative for hematuria.   Neurological:  Negative for dizziness and light-headedness.   Psychiatric/Behavioral:  Negative for altered mental status.    Allergic/Immunologic: Negative.         Objective     /70 (BP Location: Left arm, Patient Position: Sitting)   Pulse 60   Ht 167 cm (65.75\")   Wt 101 kg (223 lb 6.4 oz)   BMI 36.33 kg/m²     Vitals and nursing note reviewed.   Constitutional:       General: Not in acute distress.     Appearance: Well-developed. Not diaphoretic.   Eyes:      Pupils: Pupils are equal, round, and reactive to light.   HENT:      Head: Normocephalic.   Pulmonary:      Effort: Pulmonary effort is normal. No respiratory distress.      Breath sounds: Normal breath sounds.   Cardiovascular:      Normal rate. Regular rhythm.   Pulses:     Intact distal pulses.   Edema:     Peripheral edema absent.   Abdominal:      General: Bowel sounds are normal.      Palpations: Abdomen is soft.   Musculoskeletal: Normal range of " motion.      Cervical back: Normal range of motion. Skin:     General: Skin is warm and dry.   Neurological:      Mental Status: Alert and oriented to person, place, and time.        Procedures          Problem List Items Addressed This Visit          Cardiac and Vasculature    Hypercholesteremia - Primary    Relevant Medications    rosuvastatin (CRESTOR) 10 MG tablet    Essential hypertension    Relevant Medications    NIFEdipine XL (PROCARDIA XL) 30 MG 24 hr tablet    lisinopril (PRINIVIL,ZESTRIL) 30 MG tablet       Gastrointestinal Abdominal     Gastrointestinal hemorrhage associated with peptic ulcer    Relevant Medications    pantoprazole (PROTONIX) 40 MG EC tablet       Pulmonary and Pneumonias    Shortness of breath       Tobacco    Smoking      CAD  -s/p stenting of the LCX and RCA, 10/29/24  -continue DAPT x 1 year without interruption  -continue statin, bb, antihypertensives    Anemia complicated by recurrent GI bleed/AVM  -transfused x 3, hgb 6.1, 11/27/24  -current hgb 8.6, no obvious bleeding, following closely with PCP   -if bleeding reoccurs, advised to proceed with pill cam   -add Protonix 40 mg daily     HTN  -well controlled with nifedipine, lisinopril and metoprolol     Hyperlipidemia  - on 10/29/24, Crestor 10 mg started  -will monitor LDL with goal <70.     Tobacco abuse  -encourage smoking cessation     FU 6 months, sooner as needed.     Yeimi Mendoza  reports that she has been smoking cigarettes. She started smoking about 39 years ago. She has a 58.5 pack-year smoking history. She has never used smokeless tobacco. I have educated her on the risk of diseases from using tobacco products such as cancer, COPD, and heart disease. I advised her to quit and she is not willing to quit. I spent 3  minutes counseling the patient.             Electronically signed by JEMMA Fontenot,  January 24, 2025 11:03 EST

## 2025-01-21 ENCOUNTER — OFFICE VISIT (OUTPATIENT)
Dept: CARDIOLOGY | Facility: CLINIC | Age: 75
End: 2025-01-21
Payer: MEDICARE

## 2025-01-21 VITALS
BODY MASS INDEX: 35.9 KG/M2 | DIASTOLIC BLOOD PRESSURE: 70 MMHG | HEART RATE: 60 BPM | SYSTOLIC BLOOD PRESSURE: 130 MMHG | WEIGHT: 223.4 LBS | HEIGHT: 66 IN

## 2025-01-21 DIAGNOSIS — K27.4 GASTROINTESTINAL HEMORRHAGE ASSOCIATED WITH PEPTIC ULCER: ICD-10-CM

## 2025-01-21 DIAGNOSIS — F17.200 SMOKING: ICD-10-CM

## 2025-01-21 DIAGNOSIS — R06.02 SHORTNESS OF BREATH: ICD-10-CM

## 2025-01-21 DIAGNOSIS — I10 ESSENTIAL HYPERTENSION: ICD-10-CM

## 2025-01-21 DIAGNOSIS — E78.00 HYPERCHOLESTEREMIA: Primary | ICD-10-CM

## 2025-01-21 RX ORDER — LISINOPRIL 30 MG/1
30 TABLET ORAL DAILY
Qty: 90 TABLET | Refills: 3 | Status: SHIPPED | OUTPATIENT
Start: 2025-01-21

## 2025-01-21 RX ORDER — ASPIRIN 81 MG/1
81 TABLET ORAL DAILY
COMMUNITY

## 2025-01-21 RX ORDER — PANTOPRAZOLE SODIUM 40 MG/1
40 TABLET, DELAYED RELEASE ORAL DAILY
Qty: 90 TABLET | Refills: 3 | Status: SHIPPED | OUTPATIENT
Start: 2025-01-21

## 2025-01-21 RX ORDER — CLOPIDOGREL BISULFATE 75 MG/1
75 TABLET ORAL DAILY
COMMUNITY
End: 2025-01-21 | Stop reason: SDUPTHER

## 2025-01-21 RX ORDER — FERROUS SULFATE 324(65)MG
324 TABLET, DELAYED RELEASE (ENTERIC COATED) ORAL 2 TIMES DAILY WITH MEALS
COMMUNITY

## 2025-01-21 RX ORDER — NIFEDIPINE 30 MG/1
30 TABLET, EXTENDED RELEASE ORAL DAILY
Qty: 90 TABLET | Refills: 3 | Status: SHIPPED | OUTPATIENT
Start: 2025-01-21

## 2025-01-21 RX ORDER — CLOPIDOGREL BISULFATE 75 MG/1
75 TABLET ORAL DAILY
Qty: 90 TABLET | Refills: 3 | Status: SHIPPED | OUTPATIENT
Start: 2025-01-21

## 2025-01-21 RX ORDER — ALBUTEROL SULFATE 90 UG/1
2 INHALANT RESPIRATORY (INHALATION) EVERY 4 HOURS PRN
COMMUNITY

## 2025-01-21 RX ORDER — ROSUVASTATIN CALCIUM 10 MG/1
10 TABLET, COATED ORAL DAILY
COMMUNITY
End: 2025-01-21 | Stop reason: SDUPTHER

## 2025-01-21 RX ORDER — ROSUVASTATIN CALCIUM 10 MG/1
10 TABLET, COATED ORAL DAILY
Qty: 90 TABLET | Refills: 3 | Status: SHIPPED | OUTPATIENT
Start: 2025-01-21

## 2025-01-22 ENCOUNTER — TELEPHONE (OUTPATIENT)
Dept: CARDIOLOGY | Facility: CLINIC | Age: 75
End: 2025-01-22
Payer: MEDICARE

## 2025-01-24 ENCOUNTER — TELEPHONE (OUTPATIENT)
Dept: CARDIOLOGY | Facility: CLINIC | Age: 75
End: 2025-01-24
Payer: MEDICARE

## 2025-01-24 PROBLEM — K27.4 GASTROINTESTINAL HEMORRHAGE ASSOCIATED WITH PEPTIC ULCER: Status: ACTIVE | Noted: 2025-01-24

## 2025-01-24 NOTE — TELEPHONE ENCOUNTER
Jessica, at your convenience, can you pull her last EGD with Dr. Arnold from [facility] computer, 11/27/24, I believe.     Thank you